# Patient Record
(demographics unavailable — no encounter records)

---

## 2025-06-12 NOTE — HISTORY OF PRESENT ILLNESS
[de-identified] : 80 yro male pt self-referred for throat eval.  Pt with h/o throat cancer about 6 years ago s/p surgery at [facility], and Chemo and RT and [facility] in [facility].  Pt has been following with ENT Dr. Santosh Dozier and oncologist  Dr. Reynaga in [place] when he lives.  Pt will be in [place] for the summer.  Wife states Dr. Dozier saw something near pts vocal cord during regular check-up and sent them for "videoscopy" and recommended biopsy.  Pt Wife has picture from 5/7/25.  Today pt denies throat pain, dysphagia, dyspnea, or dysphonia. Pt reports getting out of breathe quickly but he has emphysema so that's not new. No new changes in quality of voice. It has been hoarse seen treatment.  Reports one episode this morning of some blood in the throat phlegm.  Quit smoking about 6 yrs ago when he got the cancer. Previously smoked over 1ppd for over 50 yrs.

## 2025-06-12 NOTE — PLAN
[TextEntry] : - H/O what seems to be an oropharyngeal cancer (possible unknow primary) treated with surgery at [place] and adjuvant CRT at [facility] 6 years ago. - Now with a 2 mm polypoid lesion of the L false VC.  - Recommended DL and biopsy. Risks and complications of the procedure discussed today.

## 2025-07-24 NOTE — HISTORY OF PRESENT ILLNESS
[de-identified] : 80 yro male pt was self-referred for throat eval.  Pt with h/o throat cancer about 6 years ago s/p surgery at [facility], and Chemo and RT and [facility] in [facility].  Pt has been following with ENT Dr. Santosh Dozier and oncologist  Dr. Reynaga in [place] when he lives.  Pt will be in [place] for the summer.  Wife states Dr. Dozier saw something near pts vocal cord during regular check-up and sent them for "videoscopy" and recommended biopsy.  Pt Wife has picture from 5/7/25.  Pt is now s/p Direct Laryngoscopy on 7/9/25   PATHOLOGY 7/9/25 Specimen(s) Submitted 1- Left posterior false vocal cord  Final Diagnosis  1. Larynx, left posterior false vocal cord, biopsy - Polypoid laryngeal mucosa with ulceration, acute and chronic inflammation, fibrin deposition, squamous hyperplasia,  hyperparakeratosis, and cytological atypia favor reactive atypia, clinical correlation, close and long-term clinical follow-up are recommended, see comment  Comment:  2 serial sections and 2 deep levels are performed.

## 2025-07-24 NOTE — PLAN
[TextEntry] : - H/O what seems to be an oropharyngeal cancer (possible unknow primary) treated with surgery at [place] and adjuvant CRT at [facility] 6 years ago. - Now with a 2 mm polypoid lesion of the L false VC.  - Recommended DL and biopsy. Risks and complications of the procedure discussed today.

## 2025-07-24 NOTE — PLAN
[TextEntry] : - H/O what seems to be an oropharyngeal cancer (possible unknow primary) treated with surgery at [place] and adjuvant CRT at [facility] 6 years ago. - Now with a 2 mm polypoid lesion of the L false VC.  - Recommended DL and biopsy. Risks and complications of the procedure discussed today.

## 2025-07-24 NOTE — REASON FOR VISIT
[Post-Operative Visit] : a post-operative visit [FreeTextEntry2] : s/p Direct Laryngoscopy on 7/9/25

## 2025-07-24 NOTE — REVIEW OF SYSTEMS
Patient informed of message and recommendations. He verbalizes understanding.   [As Noted in HPI] : as noted in HPI

## 2025-07-24 NOTE — HISTORY OF PRESENT ILLNESS
[de-identified] : 80 yro male pt was self-referred for throat eval.  Pt with h/o throat cancer about 6 years ago s/p surgery at [facility], and Chemo and RT and [facility] in [facility].  Pt has been following with ENT Dr. Santosh Dozier and oncologist  Dr. Reynaga in [place] when he lives.  Pt will be in [place] for the summer.  Wife states Dr. Dozier saw something near pts vocal cord during regular check-up and sent them for "videoscopy" and recommended biopsy.  Pt Wife has picture from 5/7/25.  Pt is now s/p Direct Laryngoscopy on 7/9/25   PATHOLOGY 7/9/25 Specimen(s) Submitted 1- Left posterior false vocal cord  Final Diagnosis  1. Larynx, left posterior false vocal cord, biopsy - Polypoid laryngeal mucosa with ulceration, acute and chronic inflammation, fibrin deposition, squamous hyperplasia,  hyperparakeratosis, and cytological atypia favor reactive atypia, clinical correlation, close and long-term clinical follow-up are recommended, see comment  Comment:  2 serial sections and 2 deep levels are performed.